# Patient Record
Sex: FEMALE | Race: WHITE | NOT HISPANIC OR LATINO | Employment: UNEMPLOYED | URBAN - METROPOLITAN AREA
[De-identification: names, ages, dates, MRNs, and addresses within clinical notes are randomized per-mention and may not be internally consistent; named-entity substitution may affect disease eponyms.]

---

## 2022-10-31 ENCOUNTER — OFFICE VISIT (OUTPATIENT)
Dept: INTERNAL MEDICINE | Facility: CLINIC | Age: 22
End: 2022-10-31

## 2022-10-31 VITALS
SYSTOLIC BLOOD PRESSURE: 116 MMHG | TEMPERATURE: 97.5 F | HEART RATE: 88 BPM | OXYGEN SATURATION: 99 % | WEIGHT: 216 LBS | DIASTOLIC BLOOD PRESSURE: 78 MMHG | HEIGHT: 62 IN | BODY MASS INDEX: 39.75 KG/M2

## 2022-10-31 DIAGNOSIS — Z76.89 ENCOUNTER TO ESTABLISH CARE: Primary | ICD-10-CM

## 2022-10-31 DIAGNOSIS — R93.0 ABNORMAL CT OF THE HEAD: ICD-10-CM

## 2022-10-31 DIAGNOSIS — G44.89 OTHER HEADACHE SYNDROME: ICD-10-CM

## 2022-10-31 PROCEDURE — 99203 OFFICE O/P NEW LOW 30 MIN: CPT | Performed by: NURSE PRACTITIONER
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
## 2022-11-11 ENCOUNTER — TELEPHONE (OUTPATIENT)
Dept: INTERNAL MEDICINE | Facility: CLINIC | Age: 22
End: 2022-11-11

## 2022-11-11 NOTE — TELEPHONE ENCOUNTER
Pt's mom called the office b/c she was suppose to have a MRI of the brain w and w/o contrast. I told the pts mom, nataliia was probably waiting to get the results from [facility] before she order the MRI. The mother said she would attempt to contact [facility] again for the images b/c I informed her they weren't in the pt chart just yet. Please advise.

## 2022-11-17 ENCOUNTER — TELEPHONE (OUTPATIENT)
Dept: INTERNAL MEDICINE | Facility: CLINIC | Age: 22
End: 2022-11-17

## 2022-11-17 NOTE — TELEPHONE ENCOUNTER
Caller: HARMONY CARR    Relationship: Mother    Best call back number: [phone] TO LEAVE DETAILED MESSAGE IF NO ANSWER- NOT SURE VOICE MAIL IS WORKING- CAN LEAVE TEXT OR KEEP CALLING    Caller requesting test results: MOM    What test was performed: CT OF FACE AND HEAD DUE TO GROWTH ON BRAIN    When was the test performed: 10/20/22    Where was the test performed: [facility]    Additional notes: PLEASE CALL MOM TO ADVISE IF RESULTS HAVE BEEN RECEIVED. MOM SPOKE WITH [facility]- THEY HAVE FAXED REPORTS TODAY 11/17/22. ALSO SPOKE WITH RADIOLOGY- THEY REQUEST A RECORDS REQUEST TO BE FAXED TO FAX: [phone] AND THEY WILL POWER SHARE THE ACTUAL IMAGES.

## 2022-12-13 ENCOUNTER — TELEPHONE (OUTPATIENT)
Dept: INTERNAL MEDICINE | Facility: CLINIC | Age: 22
End: 2022-12-13

## 2022-12-13 DIAGNOSIS — R93.0 ABNORMAL CT OF THE HEAD: ICD-10-CM

## 2022-12-13 DIAGNOSIS — G44.89 OTHER HEADACHE SYNDROME: Primary | ICD-10-CM

## 2022-12-13 NOTE — TELEPHONE ENCOUNTER
Caller: HARMONY CARR    Relationship: Mother    Best call back number: [phone]    What orders are you requesting (i.e. lab or imaging): MRI WITH AND WITHOUT CONTRAST     In what timeframe would the patient need to come in: AS SOON AS POSSIBLE     Where will you receive your lab/imaging services: WHERE EVER IS THE FASTEST     Additional notes: THE PATIENTS MOTHER STATES THAT SHE CALLED [facility] AND THEY DO NOT HAVE AN ORDER ON FILE FOR THE PATIENT TO HAVE AN MRI THE PATIENTS MOTHER WOULD LIKE THAT SENT IN AS SOON AS POSSIBLE

## 2023-02-15 ENCOUNTER — TELEPHONE (OUTPATIENT)
Dept: INTERNAL MEDICINE | Facility: CLINIC | Age: 23
End: 2023-02-15
Payer: COMMERCIAL

## 2023-02-15 NOTE — TELEPHONE ENCOUNTER
[facility] called and stated the pt;s insurance denied the MRI, there is an option for peer to peer. Please call [phone] and the case ID is [id number], but if you don't think the pt needs the MRI or would like to do different imaging I can call  [facility] scheduling and cancel the MRI, please advise

## 2023-04-05 ENCOUNTER — HOSPITAL ENCOUNTER (OUTPATIENT)
Dept: MRI IMAGING | Facility: HOSPITAL | Age: 23
Discharge: HOME | End: 2023-04-05
Payer: COMMERCIAL

## 2023-04-05 DIAGNOSIS — G44.89 OTHER HEADACHE SYNDROME: ICD-10-CM

## 2023-04-05 DIAGNOSIS — R93.0 ABNORMAL CT OF THE HEAD: ICD-10-CM

## 2023-05-07 ENCOUNTER — HOSPITAL ENCOUNTER (OUTPATIENT)
Dept: MRI IMAGING | Facility: HOSPITAL | Age: 23
Discharge: HOME | End: 2023-05-07
Admitting: NURSE PRACTITIONER
Payer: COMMERCIAL

## 2023-05-07 PROCEDURE — 0 GADOBENATE DIMEGLUMINE 529 MG/ML SOLUTION: Performed by: NURSE PRACTITIONER

## 2023-05-07 PROCEDURE — 70553 MRI BRAIN STEM W/O & W/DYE: CPT

## 2023-05-07 PROCEDURE — A9577 INJ MULTIHANCE: HCPCS | Performed by: NURSE PRACTITIONER

## 2023-05-07 RX ADMIN — GADOBENATE DIMEGLUMINE 20 ML: 529 INJECTION, SOLUTION INTRAVENOUS at 18:48

## 2023-05-10 ENCOUNTER — TELEPHONE (OUTPATIENT)
Dept: INTERNAL MEDICINE | Facility: CLINIC | Age: 23
End: 2023-05-10
Payer: COMMERCIAL

## 2023-05-10 NOTE — TELEPHONE ENCOUNTER
PT'S MOTHER CALLED THE OFFICE AND ASKED IF WE RECEIVED THE MRI,, I DIDN'T SEE ANYTHING IN THE CHART, PLEASE ADVISE.

## 2023-05-10 NOTE — TELEPHONE ENCOUNTER
Caller: Sita Thurston    Relationship: Self    Best call back number: [phone]        What test was performed: MRI     When was the test performed: 05/07/2023    Where was the test performed: [facility]     Additional notes: THE PATIENT STATES THAT SHE IS UNABLE TO LOG IN TO [employer] SHE WOULD LIKE TO KNOW IF HER RESULTS ARE IN YET PLEASE CALL PATIENT TO DISCUSS

## 2024-11-07 ENCOUNTER — OFFICE VISIT (OUTPATIENT)
Dept: FAMILY MEDICINE CLINIC | Facility: CLINIC | Age: 24
End: 2024-11-07
Payer: MEDICAID

## 2024-11-07 VITALS
HEART RATE: 107 BPM | SYSTOLIC BLOOD PRESSURE: 132 MMHG | WEIGHT: 231.8 LBS | DIASTOLIC BLOOD PRESSURE: 88 MMHG | TEMPERATURE: 98.2 F | RESPIRATION RATE: 18 BRPM | BODY MASS INDEX: 42.65 KG/M2 | HEIGHT: 62 IN | OXYGEN SATURATION: 98 %

## 2024-11-07 DIAGNOSIS — L73.2 HIDRADENITIS SUPPURATIVA OF MULTIPLE SITES: Primary | ICD-10-CM

## 2024-11-07 DIAGNOSIS — Z30.09 ENCOUNTER FOR COUNSELING REGARDING CONTRACEPTION: ICD-10-CM

## 2024-11-07 DIAGNOSIS — Z28.82 PARENT REFUSES IMMUNIZATIONS: ICD-10-CM

## 2024-11-07 DIAGNOSIS — E66.01 MORBID (SEVERE) OBESITY DUE TO EXCESS CALORIES: ICD-10-CM

## 2024-11-07 LAB
B-HCG UR QL: NEGATIVE
EXPIRATION DATE: NORMAL
INTERNAL NEGATIVE CONTROL: NORMAL
INTERNAL POSITIVE CONTROL: NORMAL
Lab: NORMAL

## 2024-11-07 RX ORDER — NORETHINDRONE ACETATE AND ETHINYL ESTRADIOL 1MG-20(21)
1 KIT ORAL DAILY
Qty: 28 TABLET | Refills: 12 | Status: SHIPPED | OUTPATIENT
Start: 2024-11-07 | End: 2025-11-07

## 2024-11-07 NOTE — PROGRESS NOTES
"     New Patient Office Visit      Date: 2024  Patient Name: Sita Thurston  : 2000   MRN: [id number]     Chief Complaint:    Chief Complaint   Patient presents with    Establish Care     Lump on both breast       History of Present Illness: Sita Thurston is a 24 y.o. female who is here today to establish as a patient as well as for evaluation of bilateral \"breast cysts \"noted for about 2 months now, really not changed in size, not getting bigger or smaller, not really bother her other than by the fact they are there.  Denies any overlying skin change or nipple discharge.  No history of prior breast cysts or similar lesions, though has had similar lesions off-and-on for many years in her axillary region that was never formally addressed by physician.  She is otherwise doing well.  She is not on any hormone therapy or birth control.  There is a family history of breast cancer in 2 great aunts as well as a grandmother and a great-grandmother, no first-degree relatives with breast cancer.    Subjective      Review of Systems:   Review of Systems    Past Medical History:   Past Medical History:   Diagnosis Date    Heart murmur        Past Surgical History: History reviewed. No pertinent surgical history.    Family History:   Family History   Problem Relation Age of Onset    Hyperlipidemia Mother     Hypertension Mother     Diabetes Mother     Arthritis Mother     Migraines Mother     Obesity Mother     Arthritis Father     Thyroid disease Maternal Grandmother     Obesity Maternal Grandmother     Kidney disease Maternal Grandmother     Bleeding Disorder Maternal Grandmother     Stroke Maternal Grandfather     Obesity Maternal Grandfather     Heart disease Maternal Grandfather        Social History:   Social History     Socioeconomic History    Marital status: Single   Tobacco Use    Smoking status: Never    Smokeless tobacco: Never   Vaping Use    Vaping status: Never Used   Substance and Sexual " "Activity    Alcohol use: Not Currently    Drug use: Never    Sexual activity: Not Currently       Medications:     Current Outpatient Medications:     norethindrone-ethinyl estradiol FE (Junel FE 1/20) 1-20 MG-MCG per tablet, Take 1 tablet by mouth Daily., Disp: 28 tablet, Rfl: 12    Allergies:   No Known Allergies    Objective     Physical Exam:  Vital Signs:   Vitals:    11/07/24 1440   BP: 132/88   BP Location: Left arm   Patient Position: Sitting   Cuff Size: Adult   Pulse: 107   Resp: 18   Temp: 98.2 °F (36.8 °C)   TempSrc: Temporal   SpO2: 98%   Weight: 105 kg (231 lb 12.8 oz)   Height: 157.5 cm (62\")   PainSc: 0-No pain     Body mass index is 42.4 kg/m².   Facility age limit for growth %kofi is 20 years.  Class 3 Severe Obesity (BMI >=40). Obesity-related health conditions include the following:  none . Obesity is unchanged. BMI is is above average; BMI management plan is completed. We discussed portion control, increasing exercise, and pharmacologic options including GLP-1 agonist which is not affordable, as well as phentermine and Topamax .       Physical Exam  Exam conducted with a chaperone present.   Constitutional:       General: She is not in acute distress.     Appearance: Normal appearance. She is obese. She is not ill-appearing.      Comments: Pleasant, healthy, NAD, BMI 42.4 with no prior weight for comparison   Cardiovascular:      Rate and Rhythm: Normal rate and regular rhythm.      Heart sounds: Normal heart sounds. No murmur heard.     No friction rub. No gallop.   Pulmonary:      Effort: Pulmonary effort is normal. No respiratory distress.      Breath sounds: Normal breath sounds.   Chest:   Breasts:     Kings Score is 5.      Breasts are symmetrical.      Right: Normal. Skin change present. No swelling, bleeding, inverted nipple, mass, nipple discharge or tenderness.      Left: Normal. Skin change present. No swelling, bleeding, inverted nipple, mass, nipple discharge or tenderness.      " Comments: Bilateral breast with several superficial skin lesions/nodules measuring 0.5-1 cm diameter, nonfluctuant, nontender, erythematous in nature, consistent with hidradenitis suppurativa.  Breast exam itself with no nodules cysts or tenderness appreciated, overlying skin changes as noted above otherwise unremarkable, no nipple discharge  Skin:     General: Skin is warm and dry.      Findings: Lesion present.      Comments: Several superficial skin lesions/nodules on each breast, erythematous in color, nontender, nonfluctuant with no pustules, no subcutaneous skin lesions or breast nodules/cysts, axillary exam bilaterally with scarring and few faint tunneling tracks consistent with nonactive hidradenitis suppurativa   Neurological:      General: No focal deficit present.      Mental Status: She is alert.   Psychiatric:         Mood and Affect: Mood normal.         POCT Results (if applicable):   Results for orders placed or performed in visit on 11/07/24   POC Pregnancy, Urine    Collection Time: 11/07/24  3:48 PM    Specimen: Urine   Result Value Ref Range    HCG, Urine, QL Negative Negative    Lot Number [id number]     Internal Positive Control Passed Positive, Passed    Internal Negative Control Passed Negative, Passed    Expiration Date 05/08/2025        Procedures    Assessment / Plan      Assessment/Plan:   Diagnoses and all orders for this visit:    1. Hidradenitis suppurativa of multiple sites (Primary)  Assessment & Plan:  Clinical exam consistent with hidradenitis suppurativa, having old scarring lesions in both axillae bilaterally though not actively inflamed at that time, bilateral cutaneous skin lesions over the breasts undoubtedly similar process.  Does not appear to be acutely infected.  We did discuss treatment options including initiation of doxycycline, but given patient has suboptimal contraception at this time utilizing condoms, we will hold off on the doxycycline until she has been on oral  contraceptives for 1 month, then assuming no clinical change, start doxycycline 100 mg twice daily x 14 days then daily for several months subsequently, caution sun exposure as well as dry allergenicity.      2. Morbid (severe) obesity due to excess calories  Assessment & Plan:  Initial evaluation on this very nice patient who describes longstanding obesity, somewhat fluctuant with pregnancies.  We did discuss need for healthy diet with diet and exercise, noting patient consumes multiple sodas daily with suboptimal food choices, and overall sedentary.  Advised to improve diet, increase exercise, and assess clinical response at next visit.  If not making any headway, we will then discuss other treatment options including phentermine as a most cost effective agent initially, noting patient unable to afford GLP-1 agonist.      3. Encounter for counseling regarding contraception  Assessment & Plan:  Patient currently utilizing condoms for contraception which certainly is suboptimal.  We did discuss initiation of formal birth control, and she would like to start OCPs, noting negative UCG today.  Prescription given for Junel 1/20, patient being made aware of proper dosing protocol, as well as potential side effects.  Advised may take 1 month for complete contraceptive effect, thus use condoms in the next month.  Advise if problems in the interim    Orders:  -     norethindrone-ethinyl estradiol FE (Junel FE 1/20) 1-20 MG-MCG per tablet; Take 1 tablet by mouth Daily.  Dispense: 28 tablet; Refill: 12  -     POC Pregnancy, Urine    4. Parent refuses immunizations  Assessment & Plan:  Patient declines recommended influenza and COVID-19 vaccines today.  She also is a candidate for HPV vaccine series.  Will discuss again at her upcoming recommended complete physical.           Follow Up:   Return in about 1 month (around 12/7/2024) for Annual physical.    At [facility], we believe that sharing information builds trust and  better relationships. You are receiving this note because you recently visited [facility]. It is possible you will see health information before a provider has talked with you about it. This kind of information can be easy to misunderstand. To help you fully understand what it means for your health, we urge you to discuss this note with your provider.    Dax Mcbride MD  [facility]

## 2024-11-07 NOTE — ASSESSMENT & PLAN NOTE
Patient declines recommended influenza and COVID-19 vaccines today.  She also is a candidate for HPV vaccine series.  Will discuss again at her upcoming recommended complete physical.

## 2024-11-07 NOTE — ASSESSMENT & PLAN NOTE
Initial evaluation on this very nice patient who describes longstanding obesity, somewhat fluctuant with pregnancies.  We did discuss need for healthy diet with diet and exercise, noting patient consumes multiple sodas daily with suboptimal food choices, and overall sedentary.  Advised to improve diet, increase exercise, and assess clinical response at next visit.  If not making any headway, we will then discuss other treatment options including phentermine as a most cost effective agent initially, noting patient unable to afford GLP-1 agonist.

## 2024-11-07 NOTE — ASSESSMENT & PLAN NOTE
Patient currently utilizing condoms for contraception which certainly is suboptimal.  We did discuss initiation of formal birth control, and she would like to start OCPs, noting negative UCG today.  Prescription given for Junel 1/20, patient being made aware of proper dosing protocol, as well as potential side effects.  Advised may take 1 month for complete contraceptive effect, thus use condoms in the next month.  Advise if problems in the interim

## 2024-11-07 NOTE — ASSESSMENT & PLAN NOTE
Clinical exam consistent with hidradenitis suppurativa, having old scarring lesions in both axillae bilaterally though not actively inflamed at that time, bilateral cutaneous skin lesions over the breasts undoubtedly similar process.  Does not appear to be acutely infected.  We did discuss treatment options including initiation of doxycycline, but given patient has suboptimal contraception at this time utilizing condoms, we will hold off on the doxycycline until she has been on oral contraceptives for 1 month, then assuming no clinical change, start doxycycline 100 mg twice daily x 14 days then daily for several months subsequently, caution sun exposure as well as dry allergenicity.